# Patient Record
Sex: MALE | Race: WHITE | NOT HISPANIC OR LATINO | Employment: OTHER | URBAN - METROPOLITAN AREA
[De-identification: names, ages, dates, MRNs, and addresses within clinical notes are randomized per-mention and may not be internally consistent; named-entity substitution may affect disease eponyms.]

---

## 2022-01-12 NOTE — PROGRESS NOTES
1/13/2022      Chief Complaint   Patient presents with    Erectile Dysfunction         Assessment and Plan    77 y o  male -- New patient    1  Erectile dysfunction  2  Obesity  - discussed conservative measures to help erectile function including diet, exercise, weight loss  - patient previously taking sildenafil 25 mg on a full stomach  Reviewed medication with patient today including need to take on an empty stomach  Discussed patient may take up to 100 mg as needed  Patient denies chest pain or use of nitroglycerin  -  discussed possibly switching to Cialis should he have no benefit with sildenafil  - discussed adjuvant therapy with penile vacuum assisted device  - patient will follow-up in 2-3 months for symptom reassessment  - call in the meantime with any questions or concerns  Patient can call in for refills of sildenafil should this work for him or Cialis should he wish to trial that instead  - All questions answered; patient understands and agrees with plan    3  Routine prostate cancer screening  4  Family history of prostate cancer in his father  - most recent PSA (February 2021) 0 7  - will obtain updated PSA in the near future  - will have CHRISTIAN at next visit      History of Present Illness  Vinnie Ybarra is a 77 y o  male new patient here for evaluation of erectile dysfunction  Patient states that he has been having erectile dysfunction for the past 1 and half to 2 years  States that this has not been progressing  Denies any precipitating events  States that he was more active at his previous job, now is more sedentary  States that he does have a lot of stress recently  Denies history of diabetes  Patient does have hypertension that is controlled  Patient takes Effexor for anxiety  States that he is able to achieve erections from time to time and does have libido  Testosterone testing completely normal, previously diagnosed with RICCARDO, however no longer required to use CPAP    Most recent PSA 0 7 (February 2021)  States his father did have prostate cancer, denies any other family history of  malignancies  Overall happy with the way he is urinating  States he does have nocturia 1-2 nightly, however it is not overall bothered by this  Denies gross hematuria, dysuria, fever, chills, nausea, vomiting  Denies seeing urology in the past      Review of Systems   Constitutional: Negative for activity change, appetite change, chills and fever  HENT: Negative for congestion and trouble swallowing  Respiratory: Negative for cough and shortness of breath  Cardiovascular: Negative for chest pain, palpitations and leg swelling  Gastrointestinal: Negative for abdominal pain, constipation, diarrhea, nausea and vomiting  Genitourinary: Negative for difficulty urinating, dysuria, flank pain, frequency, hematuria and urgency  Musculoskeletal: Negative for back pain and gait problem  Skin: Negative for wound  Allergic/Immunologic: Negative for immunocompromised state  Neurological: Negative for dizziness and syncope  Hematological: Does not bruise/bleed easily  Psychiatric/Behavioral: Negative for confusion  All other systems reviewed and are negative  Vitals  Vitals:    01/13/22 0830   BP: 118/84   Pulse: 72   SpO2: 99%   Weight: 109 kg (241 lb)   Height: 5' 4" (1 626 m)       Physical Exam  Constitutional:       General: He is not in acute distress  Appearance: Normal appearance  He is not ill-appearing, toxic-appearing or diaphoretic  HENT:      Head: Normocephalic  Nose: No congestion  Eyes:      General: No scleral icterus  Right eye: No discharge  Left eye: No discharge  Conjunctiva/sclera: Conjunctivae normal       Pupils: Pupils are equal, round, and reactive to light  Pulmonary:      Effort: Pulmonary effort is normal    Musculoskeletal:      Cervical back: Normal range of motion  Skin:     General: Skin is warm and dry  Coloration: Skin is not jaundiced or pale  Findings: No bruising, erythema, lesion or rash  Neurological:      General: No focal deficit present  Mental Status: He is alert and oriented to person, place, and time  Mental status is at baseline  Gait: Gait normal    Psychiatric:         Mood and Affect: Mood normal          Behavior: Behavior normal          Thought Content:  Thought content normal          Judgment: Judgment normal            Past History  Past Medical History:   Diagnosis Date    GERD (gastroesophageal reflux disease)     Hypertension     Vitamin D deficiency      Social History     Socioeconomic History    Marital status: /Civil Union     Spouse name: None    Number of children: None    Years of education: None    Highest education level: None   Occupational History    None   Tobacco Use    Smoking status: Never Smoker    Smokeless tobacco: Never Used   Vaping Use    Vaping Use: Never used   Substance and Sexual Activity    Alcohol use: Yes     Comment: Social     Drug use: Never    Sexual activity: Not Currently   Other Topics Concern    None   Social History Narrative    None     Social Determinants of Health     Financial Resource Strain: Not on file   Food Insecurity: Not on file   Transportation Needs: Not on file   Physical Activity: Not on file   Stress: Not on file   Social Connections: Not on file   Intimate Partner Violence: Not on file   Housing Stability: Not on file     Social History     Tobacco Use   Smoking Status Never Smoker   Smokeless Tobacco Never Used     Family History   Problem Relation Age of Onset    No Known Problems Father     Cancer Mother         uterine     No Known Problems Sister     Hypertension Son     Hypertension Son     Anxiety disorder Daughter        The following portions of the patient's history were reviewed and updated as appropriate: allergies, current medications, past medical history, past social history, past surgical history and problem list     Results  No results found for this or any previous visit (from the past 1 hour(s))  ]  No results found for: PSA  No results found for: GLUCOSE, CALCIUM, NA, K, CO2, CL, BUN, CREATININE  No results found for: WBC, HGB, HCT, MCV, PLT    Robles Reyes PA-C

## 2022-01-13 ENCOUNTER — OFFICE VISIT (OUTPATIENT)
Dept: UROLOGY | Facility: CLINIC | Age: 67
End: 2022-01-13
Payer: MEDICARE

## 2022-01-13 VITALS
DIASTOLIC BLOOD PRESSURE: 84 MMHG | HEIGHT: 64 IN | BODY MASS INDEX: 41.15 KG/M2 | WEIGHT: 241 LBS | SYSTOLIC BLOOD PRESSURE: 118 MMHG | OXYGEN SATURATION: 99 % | HEART RATE: 72 BPM

## 2022-01-13 DIAGNOSIS — N40.0 BENIGN PROSTATIC HYPERPLASIA WITHOUT LOWER URINARY TRACT SYMPTOMS: ICD-10-CM

## 2022-01-13 DIAGNOSIS — Z12.5 SCREENING FOR PROSTATE CANCER: Primary | ICD-10-CM

## 2022-01-13 DIAGNOSIS — E66.01 OBESITY, MORBID (HCC): ICD-10-CM

## 2022-01-13 PROCEDURE — 99204 OFFICE O/P NEW MOD 45 MIN: CPT | Performed by: PHYSICIAN ASSISTANT

## 2022-01-13 RX ORDER — FAMOTIDINE 40 MG/1
TABLET, FILM COATED ORAL DAILY
COMMUNITY
Start: 2021-12-18

## 2022-01-13 RX ORDER — FOLIC ACID 0.8 MG
TABLET ORAL DAILY
COMMUNITY

## 2022-01-13 RX ORDER — FERROUS SULFATE 325(65) MG
325 TABLET ORAL
COMMUNITY

## 2022-01-13 RX ORDER — OLMESARTAN MEDOXOMIL 40 MG/1
TABLET ORAL
COMMUNITY
Start: 2021-12-18

## 2022-01-13 RX ORDER — TRIAMTERENE AND HYDROCHLOROTHIAZIDE 37.5; 25 MG/1; MG/1
CAPSULE ORAL DAILY
COMMUNITY
Start: 2021-11-07

## 2022-01-13 RX ORDER — VENLAFAXINE HYDROCHLORIDE 75 MG/1
CAPSULE, EXTENDED RELEASE ORAL DAILY
COMMUNITY
Start: 2022-01-07

## 2022-01-13 RX ORDER — AMLODIPINE BESYLATE 5 MG/1
TABLET ORAL DAILY
COMMUNITY
Start: 2021-11-07

## 2022-01-13 RX ORDER — CARVEDILOL 12.5 MG/1
TABLET ORAL DAILY
COMMUNITY
Start: 2021-11-07

## 2022-01-13 NOTE — PATIENT INSTRUCTIONS
Penile Injection Therapy    Penile injections can help you achieve an erection if you have erectile dysfunction (ED)  It works best if it's given about 5 to 15 minutes before you want an erection  Stimulation, either with a partner or self-stimulation, is required    Medication  The three most commonly used medications for injection therapy are Trimix, Bimix, and Alprostadil  Most men begin injection therapy with Trimix, which is a mixture of three ingredients: alprostadil, phentolamine, and papaverine  These ingredients work by relaxing the smooth muscle and opening the blood vessels in your penis, causing an erection  Storing your medication  The alprostadil in Trimix can weaken over time, so Trimix should be kept cold and stored away from light  The medication can be stored in either the freezer or refrigerator  Trimix has a 90 day expiration date  This date is 90 days from when the compound was mixed at the Pharmacy  If kept frozen, this date can be extended to 180 days  If you are using Bimix, you don't need to keep the medication cold because this medication does not contain alprostadil  Don't use the medication if:   It has particles or is cloudy   The rubber stopper comes off of the vial  P  reparing the injection  1  Prepare a clean surface for your supplies  2  Gather your supplies:   o 1 medication vial  o 1 syringe (one-time use only)  o 2 alcohol wipes  o Sharps container  You can use an empty detergent or bleach bottle with a cap, or a metal coffee can with a plastic top  3  Wash your hands well with soap and water  Drawing up the medication from the vial  1  If you're using the medication for the first time, take the cap off  Throw the cap away  Open an alcohol wipe and wipe the rubber stopper on the top of the vial  You must always wipe the rubber stopper with alcohol before you insert the needle  This will kill any bacteria on the rubber stopper  2  Take the syringe out of its package  Remove the cap from the needle  Be very careful not to let anything touch the needle  If anything touches the needle, you must throw the entire syringe away in the sharps container and use a new one  This is because it will no longer be sterile  3  First, holding the syringe upright so the needle faces the kimmy, pull the plunger of the syringe back past the dose you were told to inject  Next, push the plunger back up until the top of the plunger (the thin black line closest to the needle) is at the dose you were told to inject  4  Turn the syringe downward so the needle is facing the floor  Hold the syringe in your hand like you hold a pen or dart  Hold the syringe close to the needle with your thumb, index (first) and middle (second) fingers  This will keep the needle from bending as you insert it into the rubber stopper  Support the medication vial with your other hand  5  Holding the vial upright and the syringe downward, insert the needle through the [place] in the center of the rubber stopper  Push the plunger down to inject the air into the vial (see Figure 1)  You do this because the vial is pressurized  You must replace the amount of medication you remove from the vial with air  6  Turn the vial and syringe upside down (see Figure 2)  Hold the syringe with the hand you use to write with and the vial with your other hand  Don't let go of the vial, or the needle will bend  Make sure the tip of the needle is in the medication  Rotate the syringe so you're looking at the numbers and lines on the syringe  7  Pull the plunger down past your prescribed dose  This will help remove any air bubbles  Slowly push the plunger back up to your prescribed dose  Check the amount of medication in the syringe to make sure it's the correct dose  8  Check for air bubbles in the syringe  If you see any air bubbles, pull more medication into the syringe  They will go to the top, towards the needle   Slowly push the air bubbles and the extra medication back into the vial  Look at the syringe again to make sure that you have the right amount of medication  9  When you have the correct amount, pull the needle out of the vial  Place the cap back on the syringe without touching or bending the needle  If you touch or bend the needle, you will need to throw away the syringe and start at step 2  When you place the cap back on the syringe, make sure you don't push the plunger by accident  This will push the medication into the cap and result in the wrong amount when it's time to inject yourself  10  If you're using Trimix, put the medication vial back into the refrigerator  If you're using Bimix or Papaverine, you don't need to keep your medication in the refrigerator  Choosing an injection site  1  You must inject the medication into a specific area of your penis  This is so you don't inject into a nerve or blood vessel  2  Imagine that your penis is divided in 3 parts  You will give the injection in the middle third of your penis at the 10 o'clock (left side) or the 2 o'clock (right side) position (see Figure 3)  3  To prevent trauma to your penile tissue, always change sides of your penis each time you inject the medication (right side, then left side)  Keep a record each time so you don't forget  Don't inject into any vein you can see or feel because it could cause a large bruise on your penis  Don't inject straight down on the top or the bottom of your penis  Injecting the medication  1  Grasp the head of your penis, not the skin  This will allow your penis to be more fully stretched  It's easiest to inject if your penis is pulled straight out in front of you  If you aren't circumcised, pull your foreskin back before grasping the head of your penis  This will prevent the needle from going between the skin and erectile tissue  For your injection to work, the medication needs to go into the erectile tissue    2  Locate the area to be injected (the middle third of your penis)  Wipe it with an alcohol wipe  Let go of the head of your penis and  the syringe with 2 hands  3  Remove the cap covering the needle  Look at the syringe to make sure the dose is correct and you haven't pushed any medication out by accident  4  Hold the syringe between your thumb, index and middle fingers like a pen or a dart  The needle should be facing the floor  Don't place your index finger or thumb on the plunger  5  Once again, grasp the head of your penis and pull it straight out  Keep pulling on your penis from the time the needle goes into your penis until it comes out  Don't twist your penis, since this could lead to injecting the wrong area  6  Touch the needle to your skin and quickly slide it into the shaft of your penis  Remember to avoid any veins  Make sure to insert the needle at a slight angle (either the 10 o'clock or 2 o'clock position, as shown in Figure 3)  7  Move your finger so that your index finger or thumb can push in the plunger  8  Quickly push down on the plunger to inject the medication into the shaft of your penis  Be careful not to pull the syringe out as you're injecting the medication  9  Once you have injected all of the medication, quickly pull the needle out of your penis  Pull it straight out  Don't use a twisting or jerking motion, because this may cause bruising  Apply pressure at the injection site for 2 to 3 minutes with your thumb on the injection site and your index finger on the opposite side of your penis  If you're taking a blood thinner or aspirin, hold the pressure for 5 minutes  This will help to decrease any bleeding or bruising  10  Place the syringe in the sharps container  You don't need to recap it  *It usually takes a few injections to find the right dose for an erection firm enough to have sex   It's important that you follow the treatment plan that your health care provider gave you: increase by 1 unit with each injection until you achieve an erection hard enough for penetration  Call if you have reached 7-8 units with lack of an erection firm enough for intercourse  Important Points   Don't take more than 1 injection in 24 hours, even if the medication doesn't work   [employer] can inject up to 3 times a week as long as there are 24 hours between each injection   If you're currently receiving chemotherapy for cancer, ask your oncologist (cancer doctor) when you can safely inject   Don't take any other medication for ED without speaking to your health care provider  o Do not take the following medications within 18 hours of injecting (before or after):  - Sildenafil (Viagra? - 20 mg to 100 mg   - Vardenafil (Levitra? - 10 mg to 20 mg   - Avanafil (Stendra? - 50 mg to 200 mL  - If you take tadalafil (Cialis? 10 mg or 20 mg, do not inject within 72 hours (3 days) of taking the medication   If you are using tadalafil (Cialis) 5 mg daily, ask your health care provider how you should use this medication along with your injections  Supplies  Your injection medication can't be ordered from your local retail pharmacy (such as [facility], [address] [place] [address], or [facility])  It must be obtain at:    [address], [facility], [address] [place] [address]  [phone]    [address] [place], [place] [address]ESCIP[address]  [phone]    Other:  _______________________________  _______________________________  _______________________________          Priapism  Sometimes, penile injections can cause priapism (an erection that lasts too long)  Priapism can develop without sexual stimulation and doesn't go away after orgasm  When you have a full erection, no fresh blood flows into your penis  This means that your penis isn't getting oxygen, which can damage the tissue and lead to permanent ED  If you have priapism, taking pseudoephedrine HCl can help   Make sure to have pseudoephedrine HCl with you as long as you're using the penile injections  If you have problems with your heart, talk with your cardiologist about whether it's safe for you to take pseudoephedrine HCl  If you develop priapism  If you have an erection at penetration hardness that lasts 2 hours, take 4 (30 mg) tablets of pseudoephedrine HCl (Sudafed) - see figure 4    Don't take extended-release or long-acting tablets, such as Sudafed 12 hour   If your erection becomes less than penetration hardness within an hour of taking pseudoephedrine HCl, call your doctor's office the next day  If you have an erection that lasts 3 hours (you still have an erection 1 hour after taking the pseudoephedrine HCl), go to the ER  Figure 4  After Pelvic Surgery for Prostate or Bladder Cancer  Your response to oral ED medications may improve over the first 18 to 24 months after surgery  Try taking a full dose once a month  A full dose is one of the following:   100 mg of sildenafil (Viagra) taken on an empty stomach (2 hours before or 2 hours after a meal)   20 mg of vardenafil (Levitra) taken on an empty stomach   200 mg of avanafil Cathren Adán) taken with or without food   20 mg of tadalafil (Cialis) taken with or without food  Traveling with Your Medication  Speak with your health care provider about how to travel with your injection medication  They can give you a letter explaining your treatment, if needed  If you need a letter, be sure to ask for it before your travel dates    *Information [facility] by [facility]Tobi finch) and addendum provided by [facility]

## 2022-04-07 ENCOUNTER — TELEPHONE (OUTPATIENT)
Dept: UROLOGY | Facility: CLINIC | Age: 67
End: 2022-04-07

## 2022-04-07 NOTE — TELEPHONE ENCOUNTER
Called patient and left message  Patient is required to get a PSA done prior to his appointment,  The script is in the chart and they can go to any [facility] lab to have it done  If he is unable to get it done prior the patient will have to reschedule  I left the call centers number for any questions at [phone]    If patient can not have it done prior please assist in rescheduling the appointment

## 2022-04-12 NOTE — PROGRESS NOTES
4/13/2022      Chief Complaint   Patient presents with    Follow-up         Assessment and Plan    77 y o  male     1  Erectile dysfunction  2  Obesity  - doing well overall with minimal benefit with sildenafil  - discussed using Cialis instead, verses injections  Patient hesitant against ejection is a would like to try Cialis 1st   Prescription for Cialis 20 mg sent to pharmacy  Medication side effects reviewed  3  Routine prostate cancer screening  4  Family history of prostate cancer, father  - Most recent PSA 0 67  - PSA in 1 year  - call with any questions or concerns in the meantime  - All questions answered; patient understands and agrees with plan      History of Present Illness  Leonarda Cleary is a 77 y o  male patient  with history of erectile dysfunction here for follow up  Doing well overall with minimal benefit with sildenafil  Patient would like to try Cialis today  States he does have some anxiety, stress, anger problems that may be contributing to erectile dysfunction  Currently seeing a therapist   Denies any new or worsening symptoms  Denies Frequency, urgency, dysuria, gross hematuria, flank pain, suprapubic pain, fevers, chills  Most recent PSA 0 67  Family history of prostate cancer in his father  Review of Systems   Constitutional: Negative for activity change, appetite change, chills and fever  HENT: Negative for congestion and trouble swallowing  Respiratory: Negative for cough and shortness of breath  Cardiovascular: Negative for chest pain, palpitations and leg swelling  Gastrointestinal: Negative for abdominal pain, constipation, diarrhea, nausea and vomiting  Genitourinary: Negative for difficulty urinating, dysuria, flank pain, frequency, hematuria and urgency  Musculoskeletal: Negative for back pain and gait problem  Skin: Negative for wound  Allergic/Immunologic: Negative for immunocompromised state     Neurological: Negative for dizziness and syncope  Hematological: Does not bruise/bleed easily  Psychiatric/Behavioral: Negative for confusion  All other systems reviewed and are negative  Vitals  Vitals:    04/13/22 0901   BP: 130/70   BP Location: Left arm   Patient Position: Sitting   Cuff Size: Large   Pulse: 70   Resp: 16   SpO2: 97%   Weight: 113 kg (249 lb 12 8 oz)   Height: 5' 4" (1 626 m)       Physical Exam  Constitutional:       General: He is not in acute distress  Appearance: Normal appearance  He is not ill-appearing, toxic-appearing or diaphoretic  HENT:      Head: Normocephalic  Nose: No congestion  Eyes:      General: No scleral icterus  Right eye: No discharge  Left eye: No discharge  Conjunctiva/sclera: Conjunctivae normal       Pupils: Pupils are equal, round, and reactive to light  Pulmonary:      Effort: Pulmonary effort is normal    Musculoskeletal:      Cervical back: Normal range of motion  Skin:     General: Skin is warm and dry  Coloration: Skin is not jaundiced or pale  Findings: No bruising, erythema, lesion or rash  Neurological:      General: No focal deficit present  Mental Status: He is alert and oriented to person, place, and time  Mental status is at baseline  Gait: Gait normal    Psychiatric:         Mood and Affect: Mood normal          Behavior: Behavior normal          Thought Content:  Thought content normal          Judgment: Judgment normal            Past History  Past Medical History:   Diagnosis Date    GERD (gastroesophageal reflux disease)     Hypertension     Vitamin D deficiency      Social History     Socioeconomic History    Marital status: /Civil Union     Spouse name: None    Number of children: None    Years of education: None    Highest education level: None   Occupational History    None   Tobacco Use    Smoking status: Never Smoker    Smokeless tobacco: Never Used   Vaping Use    Vaping Use: Never used   Substance and Sexual Activity    Alcohol use: Yes     Comment: Social     Drug use: Never    Sexual activity: Not Currently   Other Topics Concern    None   Social History Narrative    None     Social Determinants of Health     Financial Resource Strain: Not on file   Food Insecurity: Not on file   Transportation Needs: Not on file   Physical Activity: Not on file   Stress: Not on file   Social Connections: Not on file   Intimate Partner Violence: Not on file   Housing Stability: Not on file     Social History     Tobacco Use   Smoking Status Never Smoker   Smokeless Tobacco Never Used     Family History   Problem Relation Age of Onset    No Known Problems Father     Cancer Mother         uterine     No Known Problems Sister     Hypertension Son     Hypertension Son     Anxiety disorder Daughter        The following portions of the patient's history were reviewed and updated as appropriate: allergies, current medications, past medical history, past social history, past surgical history and problem list     Results  No results found for this or any previous visit (from the past 1 hour(s))  ]  No results found for: PSA  No results found for: GLUCOSE, CALCIUM, NA, K, CO2, CL, BUN, CREATININE  No results found for: WBC, HGB, HCT, MCV, PLT    Milagros Phillips PA-C

## 2022-04-13 ENCOUNTER — OFFICE VISIT (OUTPATIENT)
Dept: UROLOGY | Facility: CLINIC | Age: 67
End: 2022-04-13
Payer: MEDICARE

## 2022-04-13 VITALS
RESPIRATION RATE: 16 BRPM | WEIGHT: 249.8 LBS | SYSTOLIC BLOOD PRESSURE: 130 MMHG | HEIGHT: 64 IN | BODY MASS INDEX: 42.65 KG/M2 | DIASTOLIC BLOOD PRESSURE: 70 MMHG | HEART RATE: 70 BPM | OXYGEN SATURATION: 97 %

## 2022-04-13 DIAGNOSIS — N52.9 ERECTILE DYSFUNCTION, UNSPECIFIED ERECTILE DYSFUNCTION TYPE: Primary | ICD-10-CM

## 2022-04-13 PROCEDURE — 99213 OFFICE O/P EST LOW 20 MIN: CPT | Performed by: PHYSICIAN ASSISTANT

## 2022-04-13 RX ORDER — TADALAFIL 20 MG/1
20 TABLET ORAL AS NEEDED
Qty: 10 TABLET | Refills: 0 | Status: SHIPPED | OUTPATIENT
Start: 2022-04-13